# Patient Record
Sex: MALE | Race: WHITE | NOT HISPANIC OR LATINO | Employment: OTHER | URBAN - METROPOLITAN AREA
[De-identification: names, ages, dates, MRNs, and addresses within clinical notes are randomized per-mention and may not be internally consistent; named-entity substitution may affect disease eponyms.]

---

## 2019-08-04 ENCOUNTER — APPOINTMENT (OUTPATIENT)
Dept: CT IMAGING | Facility: HOSPITAL | Age: 39
End: 2019-08-04

## 2019-08-04 ENCOUNTER — HOSPITAL ENCOUNTER (EMERGENCY)
Facility: HOSPITAL | Age: 39
Discharge: HOME | End: 2019-08-04
Attending: EMERGENCY MEDICINE | Admitting: EMERGENCY MEDICINE

## 2019-08-04 VITALS
WEIGHT: 230 LBS | HEART RATE: 70 BPM | RESPIRATION RATE: 18 BRPM | OXYGEN SATURATION: 97 % | SYSTOLIC BLOOD PRESSURE: 140 MMHG | DIASTOLIC BLOOD PRESSURE: 99 MMHG | HEIGHT: 78 IN | BODY MASS INDEX: 26.61 KG/M2 | TEMPERATURE: 98 F

## 2019-08-04 DIAGNOSIS — R10.9 LEFT FLANK PAIN: ICD-10-CM

## 2019-08-04 DIAGNOSIS — N20.1 URETEROLITHIASIS: Primary | ICD-10-CM

## 2019-08-04 LAB
ALBUMIN SERPL-MCNC: 4.7 G/DL (ref 3.5–5.2)
ALBUMIN/GLOB SERPL: 1.3 G/DL
ALP SERPL-CCNC: 76 U/L (ref 39–117)
ALT SERPL W P-5'-P-CCNC: 19 U/L (ref 1–41)
ANION GAP SERPL CALCULATED.3IONS-SCNC: 15 MMOL/L (ref 5–15)
AST SERPL-CCNC: 15 U/L (ref 1–40)
BACTERIA UR QL AUTO: ABNORMAL /HPF
BASOPHILS # BLD AUTO: 0.03 10*3/MM3 (ref 0–0.2)
BASOPHILS NFR BLD AUTO: 0.3 % (ref 0–1.5)
BILIRUB SERPL-MCNC: 0.6 MG/DL (ref 0.2–1.2)
BILIRUB UR QL STRIP: NEGATIVE
BUN BLD-MCNC: 10 MG/DL (ref 6–20)
BUN/CREAT SERPL: 7.5 (ref 7–25)
CALCIUM SPEC-SCNC: 9.4 MG/DL (ref 8.6–10.5)
CHLORIDE SERPL-SCNC: 99 MMOL/L (ref 98–107)
CLARITY UR: CLEAR
CO2 SERPL-SCNC: 26 MMOL/L (ref 22–29)
COLOR UR: YELLOW
CREAT BLD-MCNC: 1.34 MG/DL (ref 0.76–1.27)
DEPRECATED RDW RBC AUTO: 35.2 FL (ref 37–54)
EOSINOPHIL # BLD AUTO: 0.21 10*3/MM3 (ref 0–0.4)
EOSINOPHIL NFR BLD AUTO: 2.1 % (ref 0.3–6.2)
ERYTHROCYTE [DISTWIDTH] IN BLOOD BY AUTOMATED COUNT: 12.1 % (ref 12.3–15.4)
GFR SERPL CREATININE-BSD FRML MDRD: 60 ML/MIN/1.73
GLOBULIN UR ELPH-MCNC: 3.7 GM/DL
GLUCOSE BLD-MCNC: 160 MG/DL (ref 65–99)
GLUCOSE UR STRIP-MCNC: NEGATIVE MG/DL
HCT VFR BLD AUTO: 48.5 % (ref 37.5–51)
HGB BLD-MCNC: 17.2 G/DL (ref 13–17.7)
HGB UR QL STRIP.AUTO: ABNORMAL
HYALINE CASTS UR QL AUTO: ABNORMAL /LPF
IMM GRANULOCYTES # BLD AUTO: 0.03 10*3/MM3 (ref 0–0.05)
IMM GRANULOCYTES NFR BLD AUTO: 0.3 % (ref 0–0.5)
KETONES UR QL STRIP: NEGATIVE
LEUKOCYTE ESTERASE UR QL STRIP.AUTO: NEGATIVE
LIPASE SERPL-CCNC: 20 U/L (ref 13–60)
LYMPHOCYTES # BLD AUTO: 2.62 10*3/MM3 (ref 0.7–3.1)
LYMPHOCYTES NFR BLD AUTO: 25.8 % (ref 19.6–45.3)
MCH RBC QN AUTO: 28.5 PG (ref 26.6–33)
MCHC RBC AUTO-ENTMCNC: 35.5 G/DL (ref 31.5–35.7)
MCV RBC AUTO: 80.4 FL (ref 79–97)
MONOCYTES # BLD AUTO: 0.68 10*3/MM3 (ref 0.1–0.9)
MONOCYTES NFR BLD AUTO: 6.7 % (ref 5–12)
NEUTROPHILS # BLD AUTO: 6.57 10*3/MM3 (ref 1.7–7)
NEUTROPHILS NFR BLD AUTO: 64.8 % (ref 42.7–76)
NITRITE UR QL STRIP: NEGATIVE
NRBC BLD AUTO-RTO: 0 /100 WBC (ref 0–0.2)
PH UR STRIP.AUTO: 6 [PH] (ref 5–8)
PLATELET # BLD AUTO: 301 10*3/MM3 (ref 140–450)
PMV BLD AUTO: 9 FL (ref 6–12)
POTASSIUM BLD-SCNC: 3 MMOL/L (ref 3.5–5.2)
PROT SERPL-MCNC: 8.4 G/DL (ref 6–8.5)
PROT UR QL STRIP: ABNORMAL
RBC # BLD AUTO: 6.03 10*6/MM3 (ref 4.14–5.8)
RBC # UR: ABNORMAL /HPF
REF LAB TEST METHOD: ABNORMAL
SODIUM BLD-SCNC: 140 MMOL/L (ref 136–145)
SP GR UR STRIP: 1.02 (ref 1–1.03)
SQUAMOUS #/AREA URNS HPF: ABNORMAL /HPF
UROBILINOGEN UR QL STRIP: ABNORMAL
WBC NRBC COR # BLD: 10.14 10*3/MM3 (ref 3.4–10.8)
WBC UR QL AUTO: ABNORMAL /HPF

## 2019-08-04 PROCEDURE — 96374 THER/PROPH/DIAG INJ IV PUSH: CPT

## 2019-08-04 PROCEDURE — 80053 COMPREHEN METABOLIC PANEL: CPT | Performed by: PHYSICIAN ASSISTANT

## 2019-08-04 PROCEDURE — 74176 CT ABD & PELVIS W/O CONTRAST: CPT

## 2019-08-04 PROCEDURE — 83690 ASSAY OF LIPASE: CPT | Performed by: EMERGENCY MEDICINE

## 2019-08-04 PROCEDURE — 25010000002 ONDANSETRON PER 1 MG: Performed by: PHYSICIAN ASSISTANT

## 2019-08-04 PROCEDURE — 85025 COMPLETE CBC W/AUTO DIFF WBC: CPT | Performed by: PHYSICIAN ASSISTANT

## 2019-08-04 PROCEDURE — 96375 TX/PRO/DX INJ NEW DRUG ADDON: CPT

## 2019-08-04 PROCEDURE — 99284 EMERGENCY DEPT VISIT MOD MDM: CPT

## 2019-08-04 PROCEDURE — 25010000002 KETOROLAC TROMETHAMINE PER 15 MG: Performed by: PHYSICIAN ASSISTANT

## 2019-08-04 PROCEDURE — 81001 URINALYSIS AUTO W/SCOPE: CPT | Performed by: PHYSICIAN ASSISTANT

## 2019-08-04 RX ORDER — ONDANSETRON 2 MG/ML
4 INJECTION INTRAMUSCULAR; INTRAVENOUS ONCE
Status: COMPLETED | OUTPATIENT
Start: 2019-08-04 | End: 2019-08-04

## 2019-08-04 RX ORDER — POTASSIUM CHLORIDE 750 MG/1
40 CAPSULE, EXTENDED RELEASE ORAL ONCE
Status: COMPLETED | OUTPATIENT
Start: 2019-08-04 | End: 2019-08-04

## 2019-08-04 RX ORDER — TAMSULOSIN HYDROCHLORIDE 0.4 MG/1
1 CAPSULE ORAL NIGHTLY
Qty: 30 CAPSULE | Refills: 0 | Status: SHIPPED | OUTPATIENT
Start: 2019-08-04 | End: 2020-08-07

## 2019-08-04 RX ORDER — OXYCODONE AND ACETAMINOPHEN 7.5; 325 MG/1; MG/1
1 TABLET ORAL EVERY 6 HOURS PRN
Qty: 12 TABLET | Refills: 0 | Status: SHIPPED | OUTPATIENT
Start: 2019-08-04 | End: 2020-08-07

## 2019-08-04 RX ORDER — ONDANSETRON 4 MG/1
4 TABLET, ORALLY DISINTEGRATING ORAL EVERY 6 HOURS PRN
Qty: 12 TABLET | Refills: 0 | Status: SHIPPED | OUTPATIENT
Start: 2019-08-04 | End: 2020-08-07

## 2019-08-04 RX ORDER — SODIUM CHLORIDE 0.9 % (FLUSH) 0.9 %
10 SYRINGE (ML) INJECTION AS NEEDED
Status: DISCONTINUED | OUTPATIENT
Start: 2019-08-04 | End: 2019-08-04 | Stop reason: HOSPADM

## 2019-08-04 RX ORDER — OMEGA-3 FATTY ACIDS/FISH OIL 300-1000MG
CAPSULE ORAL AS NEEDED
COMMUNITY
End: 2020-08-07

## 2019-08-04 RX ORDER — KETOROLAC TROMETHAMINE 15 MG/ML
15 INJECTION, SOLUTION INTRAMUSCULAR; INTRAVENOUS ONCE
Status: COMPLETED | OUTPATIENT
Start: 2019-08-04 | End: 2019-08-04

## 2019-08-04 RX ADMIN — POTASSIUM CHLORIDE 40 MEQ: 750 CAPSULE, EXTENDED RELEASE ORAL at 13:24

## 2019-08-04 RX ADMIN — KETOROLAC TROMETHAMINE 15 MG: 15 INJECTION, SOLUTION INTRAMUSCULAR; INTRAVENOUS at 12:11

## 2019-08-04 RX ADMIN — ONDANSETRON 4 MG: 2 INJECTION INTRAMUSCULAR; INTRAVENOUS at 12:10

## 2019-08-04 RX ADMIN — SODIUM CHLORIDE 1000 ML: 9 INJECTION, SOLUTION INTRAVENOUS at 12:09

## 2019-08-04 NOTE — DISCHARGE INSTRUCTIONS
Rest.  Plenty of fluids.  Percocet as prescribed for pain.  Zofran for nausea.  Flomax as prescribed.  Call Dr. Lyman tomorrow morning for a time to be seen in the office tomorrow.  Return to the emergency department if worsening or uncontrolled pain, fever, vomiting or other concerns.

## 2019-08-04 NOTE — ED PROVIDER NOTES
Subjective   Kyle Arenas is a 39 y.o. male presenting to the emergency department complaining of left groin and left flank pain. He notes that his pain began abruptly approximately one week ago.  He denies any pain with urination, dysuria, or hematuria. Of note, he reports that he has been diagnosed with a kidney stone in the past and it passed on its own.   He notes associated nausea but denies any fever, chills, chest pain, or shortness of breath. He notes a surgical history of an appendectomy, 2 L5-S1 discetomies, and surgery to his L4-L5. Aside from his chronic back pain, he does not report any significant medical issues. He notes that he is followed by his primary care provider, Dereck Jonas. He is a non-smoker and denies any alcohol or illicit drug use. There are no other acute complaints at this time.        History provided by:  Patient  Groin Pain   Location:  Left groin and left flank  Severity:  Moderate  Onset quality:  Sudden  Duration:  1 day  Timing:  Constant  Progression:  Unchanged  Chronicity:  New  Context:  History of kidney stone  Relieved by:  None tried  Worsened by:  Nothing  Ineffective treatments:  None tried  Associated symptoms: abdominal pain (left flank) and nausea    Associated symptoms: no chest pain, no cough, no fever, no headaches and no shortness of breath        Review of Systems   Constitutional: Negative for chills and fever.   Respiratory: Negative for cough and shortness of breath.    Cardiovascular: Negative for chest pain.   Gastrointestinal: Positive for abdominal pain (left flank) and nausea.   Endocrine: Negative for polydipsia, polyphagia and polyuria.   Genitourinary: Positive for flank pain. Negative for dysuria and hematuria.   Musculoskeletal: Positive for back pain.   Skin: Negative for pallor.   Allergic/Immunologic: Negative for immunocompromised state.   Neurological: Negative for headaches.   Hematological: Negative.    Psychiatric/Behavioral: Negative.     All other systems reviewed and are negative.      Past Medical History:   Diagnosis Date   • DJD (degenerative joint disease)        No Known Allergies    Past Surgical History:   Procedure Laterality Date   • APPENDECTOMY         History reviewed. No pertinent family history.    Social History     Socioeconomic History   • Marital status:      Spouse name: Not on file   • Number of children: Not on file   • Years of education: Not on file   • Highest education level: Not on file   Tobacco Use   • Smoking status: Current Some Day Smoker   Substance and Sexual Activity   • Alcohol use: No   • Drug use: No         Objective   Physical Exam   Constitutional: He is oriented to person, place, and time. He appears well-developed and well-nourished. No distress.   HENT:   Head: Normocephalic and atraumatic.   Nose: Nose normal.   Eyes: Conjunctivae and EOM are normal. Pupils are equal, round, and reactive to light. No scleral icterus.   Neck: Normal range of motion. Neck supple.   Cardiovascular: Normal rate, regular rhythm, normal heart sounds and intact distal pulses.   No murmur heard.  Pulmonary/Chest: Effort normal and breath sounds normal. No respiratory distress.   Lungs clear.   Abdominal: Soft. Bowel sounds are normal. There is tenderness.   Mild left lower quadrant and left flank tenderness.   Musculoskeletal: Normal range of motion. He exhibits tenderness.   Mild left lumbar tenderness.   Neurological: He is alert and oriented to person, place, and time.   Skin: Skin is warm and dry.   Psychiatric: He has a normal mood and affect. His behavior is normal.   Nursing note and vitals reviewed.      Procedures         ED Course      The patient appears uncomfortable.  He states his pain is 7 out of 10.  Urinalysis shows 7-12 red cells with no white cells or bacteria.  Creatinine is 1.34 with his most recent being 0.8 a year ago.  He is a bit hypokalemic at 3.0 and he was given 40 mEq of KCl in the ER.  CT  shows a 7 mm obstructive stone in the proximal left ureter.    I spoke with Dr. Lyman (urology) who advised the patient can be discharged home on pain meds to follow-up in office tomorrow.  The patient will be given a prescription for Percocet, Zofran and Flomax and advised to follow-up.    [facility] query complete. Treatment plan to include limited course of prescribed  controlled substance. Risks including addiction, benefits, and alternatives presented to patient.  Query number [id number].    Recent Results (from the past 24 hour(s))   Comprehensive Metabolic Panel    Collection Time: 08/04/19 12:00 PM   Result Value Ref Range    Glucose 160 (H) 65 - 99 mg/dL    BUN 10 6 - 20 mg/dL    Creatinine 1.34 (H) 0.76 - 1.27 mg/dL    Sodium 140 136 - 145 mmol/L    Potassium 3.0 (L) 3.5 - 5.2 mmol/L    Chloride 99 98 - 107 mmol/L    CO2 26.0 22.0 - 29.0 mmol/L    Calcium 9.4 8.6 - 10.5 mg/dL    Total Protein 8.4 6.0 - 8.5 g/dL    Albumin 4.70 3.50 - 5.20 g/dL    ALT (SGPT) 19 1 - 41 U/L    AST (SGOT) 15 1 - 40 U/L    Alkaline Phosphatase 76 39 - 117 U/L    Total Bilirubin 0.6 0.2 - 1.2 mg/dL    eGFR Non African Amer 60 (L) >60 mL/min/1.73    Globulin 3.7 gm/dL    A/G Ratio 1.3 g/dL    BUN/Creatinine Ratio 7.5 7.0 - 25.0    Anion Gap 15.0 5.0 - 15.0 mmol/L   CBC Auto Differential    Collection Time: 08/04/19 12:00 PM   Result Value Ref Range    WBC 10.14 3.40 - 10.80 10*3/mm3    RBC 6.03 (H) 4.14 - 5.80 10*6/mm3    Hemoglobin 17.2 13.0 - 17.7 g/dL    Hematocrit 48.5 37.5 - 51.0 %    MCV 80.4 79.0 - 97.0 fL    MCH 28.5 26.6 - 33.0 pg    MCHC 35.5 31.5 - 35.7 g/dL    RDW 12.1 (L) 12.3 - 15.4 %    RDW-SD 35.2 (L) 37.0 - 54.0 fl    MPV 9.0 6.0 - 12.0 fL    Platelets 301 140 - 450 10*3/mm3    Neutrophil % 64.8 42.7 - 76.0 %    Lymphocyte % 25.8 19.6 - 45.3 %    Monocyte % 6.7 5.0 - 12.0 %    Eosinophil % 2.1 0.3 - 6.2 %    Basophil % 0.3 0.0 - 1.5 %    Immature Grans % 0.3 0.0 - 0.5 %    Neutrophils, Absolute 6.57 1.70 - 7.00  10*3/mm3    Lymphocytes, Absolute 2.62 0.70 - 3.10 10*3/mm3    Monocytes, Absolute 0.68 0.10 - 0.90 10*3/mm3    Eosinophils, Absolute 0.21 0.00 - 0.40 10*3/mm3    Basophils, Absolute 0.03 0.00 - 0.20 10*3/mm3    Immature Grans, Absolute 0.03 0.00 - 0.05 10*3/mm3    nRBC 0.0 0.0 - 0.2 /100 WBC   Lipase    Collection Time: 08/04/19 12:00 PM   Result Value Ref Range    Lipase 20 13 - 60 U/L   Urinalysis With Microscopic If Indicated (No Culture) - Urine, Clean Catch    Collection Time: 08/04/19 12:09 PM   Result Value Ref Range    Color, UA Yellow Yellow, Straw    Appearance, UA Clear Clear    pH, UA 6.0 5.0 - 8.0    Specific Gravity, UA 1.020 1.001 - 1.030    Glucose, UA Negative Negative    Ketones, UA Negative Negative    Bilirubin, UA Negative Negative    Blood, UA Small (1+) (A) Negative    Protein, UA Trace (A) Negative    Leuk Esterase, UA Negative Negative    Nitrite, UA Negative Negative    Urobilinogen, UA 0.2 E.U./dL 0.2 - 1.0 E.U./dL   Urinalysis, Microscopic Only - Urine, Clean Catch    Collection Time: 08/04/19 12:09 PM   Result Value Ref Range    RBC, UA 7-12 (A) None Seen, 0-2 /HPF    WBC, UA 0-2 None Seen, 0-2 /HPF    Bacteria, UA None Seen None Seen, Trace /HPF    Squamous Epithelial Cells, UA None Seen None Seen, 0-2 /HPF    Hyaline Casts, UA 0-6 0 - 6 /LPF    Methodology Automated Microscopy      Note: In addition to lab results from this visit, the labs listed above may include labs taken at another facility or during a different encounter within the last 24 hours. Please correlate lab times with ED admission and discharge times for further clarification of the services performed during this visit.    CT Abdomen Pelvis Without Contrast   Preliminary Result       Obstructing proximal left-sided 7 mm ureteral calculus with resultant   moderate hydroureteronephrosis.       Incomplete distention versus mild colonic wall thickening of the sigmoid   colon. Correlate clinically.       ADDITIONAL  "INCIDENTAL FINDINGS:       - Abnormal femoral shape noted which is chronic and could relate to   underlying femoral acetabular impingement.       - Degenerative changes lumbar spine maximally at L5-S1.       DICTATED:   08/04/2019   EDITED/ls :   08/04/2019             Vitals:    08/04/19 1151 08/04/19 1200 08/04/19 1324 08/04/19 1400   BP: 148/93  (!) 138/105 140/99   Pulse: 85  75 70   Resp: 16   18   Temp:  98 °F (36.7 °C)     TempSrc:  Oral     SpO2: 100%  94% 97%   Weight: 104 kg (230 lb)      Height: 198.1 cm (78\")        Medications   sodium chloride 0.9 % bolus 1,000 mL (0 mL Intravenous Stopped 8/4/19 1239)   ketorolac (TORADOL) injection 15 mg (15 mg Intravenous Given 8/4/19 1211)   ondansetron (ZOFRAN) injection 4 mg (4 mg Intravenous Given 8/4/19 1210)   potassium chloride (MICRO-K) CR capsule 40 mEq (40 mEq Oral Given 8/4/19 1324)     ECG/EMG Results (last 24 hours)     ** No results found for the last 24 hours. **        No orders to display                       MDM    Final diagnoses:   Ureterolithiasis   Left flank pain       Documentation assistance provided by ashleigh Malave.  Information recorded by the scribe was done at my direction and has been verified and validated by me.     Sadie Malave  08/04/19 1227       Williams Major PA  08/04/19 1325       Williams Major PA  08/04/19 1652    "

## 2020-08-07 ENCOUNTER — APPOINTMENT (OUTPATIENT)
Dept: GENERAL RADIOLOGY | Facility: HOSPITAL | Age: 40
End: 2020-08-07

## 2020-08-07 ENCOUNTER — HOSPITAL ENCOUNTER (EMERGENCY)
Facility: HOSPITAL | Age: 40
Discharge: HOME | End: 2020-08-07
Attending: EMERGENCY MEDICINE | Admitting: EMERGENCY MEDICINE

## 2020-08-07 VITALS
TEMPERATURE: 98.2 F | HEIGHT: 78 IN | HEART RATE: 92 BPM | OXYGEN SATURATION: 100 % | DIASTOLIC BLOOD PRESSURE: 105 MMHG | BODY MASS INDEX: 29.39 KG/M2 | WEIGHT: 254 LBS | RESPIRATION RATE: 18 BRPM | SYSTOLIC BLOOD PRESSURE: 138 MMHG

## 2020-08-07 DIAGNOSIS — R22.32 LOCALIZED SWELLING ON LEFT HAND: Primary | ICD-10-CM

## 2020-08-07 LAB
HOLD SPECIMEN: NORMAL
HOLD SPECIMEN: NORMAL
WHOLE BLOOD HOLD SPECIMEN: NORMAL
WHOLE BLOOD HOLD SPECIMEN: NORMAL

## 2020-08-07 PROCEDURE — 99283 EMERGENCY DEPT VISIT LOW MDM: CPT

## 2020-08-07 PROCEDURE — 73110 X-RAY EXAM OF WRIST: CPT

## 2020-08-07 RX ORDER — SODIUM CHLORIDE 0.9 % (FLUSH) 0.9 %
10 SYRINGE (ML) INJECTION AS NEEDED
Status: DISCONTINUED | OUTPATIENT
Start: 2020-08-07 | End: 2020-08-08 | Stop reason: HOSPADM

## 2020-08-07 RX ORDER — NAPROXEN 250 MG/1
500 TABLET ORAL ONCE
Status: COMPLETED | OUTPATIENT
Start: 2020-08-07 | End: 2020-08-07

## 2020-08-07 RX ORDER — CEPHALEXIN 500 MG/1
500 CAPSULE ORAL 3 TIMES DAILY
Qty: 21 CAPSULE | Refills: 0 | Status: SHIPPED | OUTPATIENT
Start: 2020-08-07

## 2020-08-07 RX ADMIN — NAPROXEN 500 MG: 250 TABLET ORAL at 21:56

## 2020-08-08 NOTE — ED PROVIDER NOTES
Subjective   40-year-old male presents for evaluation of left hand/wrist pain/swelling.  The patient notes that yesterday afternoon he worked out for the first time in quite some time.  Afterward, he noted pain and swelling to his left wrist/hand region.  He states that the pain and swelling persisted throughout the day today, prompting his visit to the emergency department.  He is right-handed.  He denies any fevers, chills, or other systemic symptoms.  He does not recall being bitten or stung by an insect yesterday.  No new environmental exposures.  The pain is limited to the dorsal aspect of his left hand and wrist and is currently 5 out of 10 in severity and worse with palpation.          Review of Systems   Skin:        Left hand/wrist pain/swelling   All other systems reviewed and are negative.      Past Medical History:   Diagnosis Date   • DJD (degenerative joint disease)        Allergies   Allergen Reactions   • Amitriptyline Hcl Itching       Past Surgical History:   Procedure Laterality Date   • APPENDECTOMY         No family history on file.    Social History     Socioeconomic History   • Marital status:      Spouse name: Not on file   • Number of children: Not on file   • Years of education: Not on file   • Highest education level: Not on file   Tobacco Use   • Smoking status: Current Some Day Smoker   Substance and Sexual Activity   • Alcohol use: No   • Drug use: No           Objective   Physical Exam   Constitutional: He is oriented to person, place, and time. He appears well-developed and well-nourished. No distress.   Well-appearing male in no acute distress   HENT:   Head: Normocephalic and atraumatic.   Mouth/Throat: Oropharynx is clear and moist.   No mucous membrane lesions   Cardiovascular: Normal rate, regular rhythm, normal heart sounds and intact distal pulses. Exam reveals no gallop and no friction rub.   No murmur heard.  Pulmonary/Chest: Effort normal and breath sounds normal. No  respiratory distress. He has no wheezes. He has no rales.   Musculoskeletal: Normal range of motion.   Range of motion of left wrist is within normal limits and minimally painful, no snuffbox tenderness noted   Neurological: He is alert and oriented to person, place, and time.   Normal  strength noted to left hand, neurovascularly intact distally in left upper extremity with bounding distal pulses normal sensation   Skin: He is not diaphoretic.   Mild edema noted to dorsal aspect of proximal left hand and wrist region, no overlying warmth, no crepitus, no purulence, no pain out of proportion to exam   Psychiatric: He has a normal mood and affect. Judgment and thought content normal.   Nursing note and vitals reviewed.      Procedures           ED Course  ED Course as of Aug 07 2150   Fri Aug 07, 2020   2146 40-year-old male presents for evaluation of left hand/wrist pain/swelling.  He states that he noted the symptoms yesterday after working out.  He states that this is the first time that he worked out in quite some time.  Afterward, he noted pain and swelling to the dorsal aspect of his left hand and wrist region.  The pain persisted throughout the day today, prompting his visit to the emergency department.  He is right-handed.  No paresthesias.  He denies any fevers or systemic symptoms.  On arrival, the patient is nontoxic-appearing.  No mucous membrane lesions.  He does not recall being bitten or stung.  Exam is remarkable only for mild soft tissue swelling to dorsal aspect of proximal left hand.  No snuffbox tenderness noted.  Normal range of motion noted.  Normal  strength.  Plain films negative.  Patient reassured and counseled regarding symptomatic management.  His symptoms are most likely secondary to working out yesterday.  We talked about compression, ice, and elevation as well as the use of anti-inflammatories.  We also talked about the potential for an insect sting and use of Benadryl as  "needed.  We also talked about the potential of early cellulitis and we will treat conservatively with Keflex.  He will follow-up with his primary care physician within the next week.  Agreeable with plan and given appropriate strict return precautions.    [DD]   2150 Doubt septic joint at this time based on overall clinical presentation.    [DD]      ED Course User Index  [DD] Kyle Shipman MD                                   Recent Results (from the past 24 hour(s))   Light Blue Top    Collection Time: 08/07/20  7:18 PM   Result Value Ref Range    Extra Tube hold for add-on    Green Top (Gel)    Collection Time: 08/07/20  7:18 PM   Result Value Ref Range    Extra Tube Hold for add-ons.    Lavender Top    Collection Time: 08/07/20  7:18 PM   Result Value Ref Range    Extra Tube hold for add-on    Gold Top - SST    Collection Time: 08/07/20  7:18 PM   Result Value Ref Range    Extra Tube Hold for add-ons.      Note: In addition to lab results from this visit, the labs listed above may include labs taken at another facility or during a different encounter within the last 24 hours. Please correlate lab times with ED admission and discharge times for further clarification of the services performed during this visit.    XR Wrist 3+ View Left   Final Result   Negative left wrist series.      Signer Name: Demetrius Amador MD    Signed: 8/7/2020 9:16 PM    Workstation Name: [id number]     [facility]        Vitals:    08/07/20 1857   BP: (!) 138/105   BP Location: Left arm   Patient Position: Sitting   Pulse: 92   Resp: 18   Temp: 98.2 °F (36.8 °C)   TempSrc: Temporal   SpO2: 100%   Weight: 115 kg (254 lb)   Height: 198.1 cm (78\")     Medications   sodium chloride 0.9 % flush 10 mL (has no administration in time range)   naproxen (NAPROSYN) tablet 500 mg (has no administration in time range)     ECG/EMG Results (last 24 hours)     ** No results found for the last 24 hours. **        No " orders to display               MDM    Final diagnoses:   Localized swelling on left hand            Kyle Shipman MD  08/07/20 0766

## 2022-02-25 PROCEDURE — U0004 COV-19 TEST NON-CDC HGH THRU: HCPCS | Performed by: FAMILY MEDICINE
